# Patient Record
Sex: MALE | Race: OTHER | NOT HISPANIC OR LATINO
[De-identification: names, ages, dates, MRNs, and addresses within clinical notes are randomized per-mention and may not be internally consistent; named-entity substitution may affect disease eponyms.]

---

## 2020-07-01 ENCOUNTER — APPOINTMENT (OUTPATIENT)
Dept: INTERNAL MEDICINE | Facility: CLINIC | Age: 25
End: 2020-07-01
Payer: COMMERCIAL

## 2020-07-01 ENCOUNTER — LABORATORY RESULT (OUTPATIENT)
Age: 25
End: 2020-07-01

## 2020-07-01 VITALS
DIASTOLIC BLOOD PRESSURE: 86 MMHG | HEIGHT: 66 IN | TEMPERATURE: 99.3 F | HEART RATE: 118 BPM | OXYGEN SATURATION: 98 % | WEIGHT: 110 LBS | BODY MASS INDEX: 17.68 KG/M2 | SYSTOLIC BLOOD PRESSURE: 124 MMHG

## 2020-07-01 DIAGNOSIS — Z78.9 OTHER SPECIFIED HEALTH STATUS: ICD-10-CM

## 2020-07-01 DIAGNOSIS — Z00.00 ENCOUNTER FOR GENERAL ADULT MEDICAL EXAMINATION W/OUT ABNORMAL FINDINGS: ICD-10-CM

## 2020-07-01 DIAGNOSIS — Z13.31 ENCOUNTER FOR SCREENING FOR DEPRESSION: ICD-10-CM
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
## 2020-07-01 PROCEDURE — G0444 DEPRESSION SCREEN ANNUAL: CPT

## 2020-07-01 PROCEDURE — 99213 OFFICE O/P EST LOW 20 MIN: CPT | Mod: 25

## 2020-07-01 PROCEDURE — 99385 PREV VISIT NEW AGE 18-39: CPT

## 2020-07-01 NOTE — HEALTH RISK ASSESSMENT
[Good] : ~his/her~  mood as  good [Yes] : Yes [] : No [Monthly or less (1 pt)] : Monthly or less (1 point) [1 or 2 (0 pts)] : 1 or 2 (0 points) [No falls in past year] : Patient reported no falls in the past year [Never (0 pts)] : Never (0 points) [Audit-CScore] : 1 [Change in mental status noted] : No change in mental status noted [Language] : denies difficulty with language [Behavior] : denies difficulty with behavior [Learning/Retaining New Information] : denies difficulty learning/retaining new information [Reasoning] : denies difficulty with reasoning [Handling Complex Tasks] : denies difficulty handling complex tasks [Spatial Ability and Orientation] : denies difficulty with spatial ability and orientation [None] : None [With Family] : lives with family [Student] : student [Sexually Active] : sexually active [Single] : single [High Risk Behavior] : no high risk behavior [Feels Safe at Home] : Feels safe at home [Fully functional (bathing, dressing, toileting, transferring, walking, feeding)] : Fully functional (bathing, dressing, toileting, transferring, walking, feeding) [Fully functional (using the telephone, shopping, preparing meals, housekeeping, doing laundry, using] : Fully functional and needs no help or supervision to perform IADLs (using the telephone, shopping, preparing meals, housekeeping, doing laundry, using transportation, managing medications and managing finances) [Reports changes in hearing] : Reports no changes in hearing [Reports changes in vision] : Reports no changes in vision [Reports normal functional visual acuity (ie: able to read med bottle)] : Reports normal functional visual acuity [Reports changes in dental health] : Reports no changes in dental health [Smoke Detector] : smoke detector [Carbon Monoxide Detector] : carbon monoxide detector [Guns at Home] : no guns at home [Safety elements used in home] : safety elements used in home [Seat Belt] :  uses seat belt [Sunscreen] : uses sunscreen [Travel to Developing Areas] : does not  travel to developing areas [TB Exposure] : is not being exposed to tuberculosis [Caregiver Concerns] : does not have caregiver concerns [AdvancecareDate] : 07/20 [Reviewed no changes] : Reviewed no changes

## 2020-07-01 NOTE — ASSESSMENT
[FreeTextEntry1] : \par Preventive visit: pt is up to date with vaccines; he does not smoke cigarettes and does not misuse alcohol; he feels safe at home and has smoke/CO detectors in the house; he wears seatbelts when in vehicles\par \par Medical issue 1: Tick bite: new symptom of rash on L upper chest; believes he was bitten by tick from his dog; he is taking his dog to the vet today; bite occurred about 1 week ago; he denies fevers, chills, joint pains and myalgias; no rash visible on exam today, no Target lesion; doubt Lyme because not from deer tick rather dog tick but will check labs and encouraged follow-up from vet regarding any disease dog may have picked up\par \par Depression screen performed today, PHQ2=0

## 2020-07-01 NOTE — HISTORY OF PRESENT ILLNESS
[de-identified] : \par Preventive visit: pt is up to date with vaccines; he does not smoke cigarettes and does not misuse alcohol; he feels safe at home and has smoke/CO detectors in the house; he wears seatbelts when in vehicles\par \par Medical issue 1: Tick bite: new symptom of rash on L upper chest; believes he was bitten by tick from his dog; he is taking his dog to the vet today; bite occurred about 1 week ago; he denies fevers, chills, joint pains and myalgias [FreeTextEntry1] : Presents to establish care for preventive visit as well as concerns regarding tick bite.

## 2020-07-06 LAB
ALBUMIN SERPL ELPH-MCNC: 5.4 G/DL
ALP BLD-CCNC: 56 U/L
ALT SERPL-CCNC: 15 U/L
ANION GAP SERPL CALC-SCNC: 13 MMOL/L
AST SERPL-CCNC: 20 U/L
B BURGDOR IGG+IGM SER QL IB: NORMAL
BASOPHILS # BLD AUTO: 0.03 K/UL
BASOPHILS NFR BLD AUTO: 0.5 %
BILIRUB SERPL-MCNC: 0.4 MG/DL
BUN SERPL-MCNC: 14 MG/DL
CALCIUM SERPL-MCNC: 10.1 MG/DL
CHLORIDE SERPL-SCNC: 101 MMOL/L
CHOLEST SERPL-MCNC: 199 MG/DL
CHOLEST/HDLC SERPL: 2.2 RATIO
CO2 SERPL-SCNC: 26 MMOL/L
CREAT SERPL-MCNC: 1.2 MG/DL
EOSINOPHIL # BLD AUTO: 0.07 K/UL
EOSINOPHIL NFR BLD AUTO: 1.2 %
ESTIMATED AVERAGE GLUCOSE: 105 MG/DL
GLUCOSE SERPL-MCNC: 110 MG/DL
HBA1C MFR BLD HPLC: 5.3 %
HCT VFR BLD CALC: 46.1 %
HDLC SERPL-MCNC: 92 MG/DL
HGB BLD-MCNC: 15.1 G/DL
HIV1+2 AB SPEC QL IA.RAPID: NONREACTIVE
IMM GRANULOCYTES NFR BLD AUTO: 0.2 %
LDLC SERPL CALC-MCNC: 95 MG/DL
LYMPHOCYTES # BLD AUTO: 2.05 K/UL
LYMPHOCYTES NFR BLD AUTO: 35.7 %
MAN DIFF?: NORMAL
MCHC RBC-ENTMCNC: 29.4 PG
MCHC RBC-ENTMCNC: 32.8 GM/DL
MCV RBC AUTO: 89.7 FL
MONOCYTES # BLD AUTO: 0.38 K/UL
MONOCYTES NFR BLD AUTO: 6.6 %
NEUTROPHILS # BLD AUTO: 3.21 K/UL
NEUTROPHILS NFR BLD AUTO: 55.8 %
PLATELET # BLD AUTO: 292 K/UL
POTASSIUM SERPL-SCNC: 4 MMOL/L
PROT SERPL-MCNC: 7.7 G/DL
RBC # BLD: 5.14 M/UL
RBC # FLD: 13 %
SODIUM SERPL-SCNC: 140 MMOL/L
TRIGL SERPL-MCNC: 59 MG/DL
TSH SERPL-ACNC: 1.73 UIU/ML
WBC # FLD AUTO: 5.75 K/UL

## 2020-07-07 DIAGNOSIS — A77.0 SPOTTED FEVER DUE TO RICKETTSIA RICKETTSII: ICD-10-CM

## 2020-07-07 LAB
A PHAGO GROEL BLD QL NAA+NON-PROBE: NEGATIVE
B DIV+MO-1 18S RRNA BLD QL NAA+NON-PROBE: NEGATIVE
B DUNCANI 18S RRNA BLD QL NAA+NON-PROBE: NEGATIVE
B MICROTI 18S RRNA BLD QL NAA+NON-PROBE: NEGATIVE
E CANIS+EWIN GROEL BLD QL NAA+NON-PROBE: NEGATIVE
E CHAFF GROEL BLD QL NAA+NON-PROBE: NEGATIVE
E MURIS EAUCL GROEL BLD QL NAA+NON-PRB: NEGATIVE
R RICKETTSI IGG CSF-ACNC: NEGATIVE
R RICKETTSI IGM CSF-ACNC: 1.2 INDEX

## 2020-07-07 RX ORDER — DOXYCYCLINE HYCLATE 100 MG/1
100 TABLET ORAL
Qty: 14 | Refills: 0 | Status: COMPLETED | COMMUNITY
Start: 2020-07-07 | End: 2020-09-20

## 2023-05-29 ENCOUNTER — EMERGENCY (EMERGENCY)
Facility: HOSPITAL | Age: 28
LOS: 1 days | Discharge: HOME | End: 2023-05-29
Admitting: STUDENT IN AN ORGANIZED HEALTH CARE EDUCATION/TRAINING PROGRAM
Payer: MEDICAID

## 2023-05-29 VITALS
TEMPERATURE: 98 F | RESPIRATION RATE: 14 BRPM | SYSTOLIC BLOOD PRESSURE: 120 MMHG | DIASTOLIC BLOOD PRESSURE: 80 MMHG | OXYGEN SATURATION: 100 % | HEART RATE: 98 BPM

## 2023-05-29 VITALS
DIASTOLIC BLOOD PRESSURE: 70 MMHG | HEART RATE: 84 BPM | SYSTOLIC BLOOD PRESSURE: 114 MMHG | RESPIRATION RATE: 14 BRPM | OXYGEN SATURATION: 100 %

## 2023-05-29 PROCEDURE — 73630 X-RAY EXAM OF FOOT: CPT | Mod: 26,LT

## 2023-05-29 PROCEDURE — 73590 X-RAY EXAM OF LOWER LEG: CPT | Mod: 26,LT

## 2023-05-29 PROCEDURE — 73610 X-RAY EXAM OF ANKLE: CPT | Mod: 26,LT

## 2023-05-29 PROCEDURE — 99285 EMERGENCY DEPT VISIT HI MDM: CPT

## 2023-05-29 RX ORDER — ACETAMINOPHEN 500 MG
650 TABLET ORAL ONCE
Refills: 0 | Status: COMPLETED | OUTPATIENT
Start: 2023-05-29 | End: 2023-05-29

## 2023-05-29 RX ADMIN — Medication 650 MILLIGRAM(S): at 15:40

## 2023-05-29 NOTE — ED PROVIDER NOTE - OBJECTIVE STATEMENT
Patient is a 27-year-old male with no pertinent past medical history presents with left foot pain since yesterday.  Patient reports while playing basketball, he jumped and landed onto left foot causing his foot to twist.  Patient reports he finished playing basketball.  Upon arriving home yesterday evening he noticed gradual onset pain and swelling at the dorsal lateral aspect of his left foot.  Patient reports pain worsens upon standing and walking.  Patient denies any fevers, chills, numbness, weakness, head trauma, neck pain, back pain, pain/injuries elsewhere.

## 2023-05-29 NOTE — ED PROVIDER NOTE - LOWER EXTREMITY EXAM, LEFT
+bruising and tenderness at dorsolateral aspect of left foot; no point tenderness; 2+ pulses; < 2 sec cap refill; 5/5 strength; sensation intact; no calf tenderness; no palpable cords; soft compartments

## 2023-05-29 NOTE — ED PROVIDER NOTE - BIRTH SEX
VN reviewed discharge instructions with pt.  AVS printed and handed to pt by bedside nurse.  Reviewed follow-up appointments, medications, diet, and importance of medication compliance.  Reviewed home care instructions, treatment plan, self-management, and when to seek medical attention.  Allowed time for questions.  All questions answered.  Patient verbalized complete understanding of discharge instructions and voices no concerns.     Discharge instructions complete. Waiting on  Bedside delivery .   Bedside nurse notified.      Male

## 2023-05-29 NOTE — CONSULT NOTE ADULT - ASSESSMENT
27 M with Left foot injury  - Patient seen and evaluated  - Patient is afebrile, VSS, neurovascular status  - Left foot global foot non pitting edema, with no ecchymoses no open fractures, normal skin tension lines, no skin tenting, no drainage. right foot with no acute signs of injury.  - Applied sheffield compression with posterior splint  - Patient instructed to keep left lower extremity non weight bearing using crutches  - Patient to keep LLE elevated under 2 pillows while in bed  - Patient instructed to keep posterior splint clean, cry and intact  - Patient to follow up outpatient within one week of discharge with Dr Castellon  - Patient to call [phone] for follow up appointment   -Discussed with attending

## 2023-05-29 NOTE — ED PROVIDER NOTE - CLINICAL SUMMARY MEDICAL DECISION MAKING FREE TEXT BOX
Patient is a 27-year-old male with no pertinent past medical history presents with left foot pain since yesterday.  Patient reports while playing basketball, he jumped and landed onto left foot causing his foot to twist.  Patient reports he finished playing basketball.  Upon arriving home yesterday evening he noticed gradual onset pain and swelling at the dorsal lateral aspect of his left foot.  Patient reports pain worsens upon standing and walking.  Patient denies any fevers, chills, numbness, weakness, head trauma, neck pain, back pain, pain/injuries elsewhere.  This is a patient with likely musculoskeletal left foot pain, possible fracture; very low clinical suspicion for disease processes including but not limited to septic joint, compartment syndrome.  Plan to order x-ray and pain medication.  Disposition pending work-up.

## 2023-05-29 NOTE — ED ADULT TRIAGE NOTE - CHIEF COMPLAINT QUOTE
pt c/o left foot pain after playing basketball yesterday. noted swelling and increased pain this am. ice pack applied

## 2023-05-29 NOTE — CONSULT NOTE ADULT - SUBJECTIVE AND OBJECTIVE BOX
Patient is a 27y old  Male who presents with a chief complaint of left foot injury    HPI: Patient is a 27-year-old male with no pertinent past medical history presents with left foot pain since yesterday.  Patient reports while playing basketball, he jumped and landed onto left foot causing his foot to twist.  Patient reports he finished playing basketball.  Upon arriving home yesterday evening he noticed gradual onset pain and swelling at the dorsal lateral aspect of his left foot.  Patient reports pain worsens upon standing and walking.  Patient denies any fevers, chills, numbness, weakness, head trauma, neck pain, back pain, pain/injuries elsewhere      PAST MEDICAL & SURGICAL HISTORY:      MEDICATIONS  (STANDING):    MEDICATIONS  (PRN):      Allergies    No Known Allergies    Intolerances        VITALS:    Vital Signs Last 24 Hrs  T(C): 36.8 (29 May 2023 13:55), Max: 36.8 (29 May 2023 13:55)  T(F): 98.3 (29 May 2023 13:55), Max: 98.3 (29 May 2023 13:55)  HR: 84 (29 May 2023 18:42) (84 - 98)  BP: 114/70 (29 May 2023 18:42) (114/70 - 120/80)  BP(mean): --  RR: 14 (29 May 2023 18:42) (14 - 14)  SpO2: 100% (29 May 2023 18:42) (100% - 100%)    Parameters below as of 29 May 2023 18:42  Patient On (Oxygen Delivery Method): room air        LABS:                CAPILLARY BLOOD GLUCOSE              LOWER EXTREMITY PHYSICAL EXAM:    Vascular: DP/PT 1/4, B/L, CFT <3 seconds B/L, Temperature gradient warm to cool, B/L.   Neuro: Epicritic sensation intact to the level of toes, B/L.  Musculoskeletal/Ortho: pain on palpation to dorsal aspect of 5th metatarsal. able to dorsiflex invert, herman and plantarflexion with slight pain  Skin: Left foot global foot non pitting edema, with no ecchymoses no open fractures, normal skin tension lines, no skin tenting, no drainage. right foot with no acute signs of injury.  - Applied sheffield compression with posterior splint    RADIOLOGY & ADDITIONAL STUDIES:    < from: Xray Foot AP + Lateral + Oblique, Left (05.29.23 @ 15:34) >    ******PRELIMINARY REPORT******      ******PRELIMINARY REPORT******         ACC: [id number] EXAM:  XR ANKLE COMP MIN 3 VIEWS LT   ORDERED BY: HENRIQUE MYERS     ACC: [id number] EXAM:  XR TIB FIB AP LAT 2 VIEWS LT   ORDERED BY: HENRIQUE MYERS     ACC: [id number] EXAM:  XR FOOT COMP MIN 3 VIEWS LT   ORDERED BY: HENRIQUE MYERS     PROCEDURE DATE:  05/29/2023    ******PRELIMINARY REPORT******      ******PRELIMINARY REPORT******           INTERPRETATION:  XR FOOT 3 VIEWS LEFT, XR TIBIA FIBULA LEFT, XR ANKLE   COMPLETE 3 VIEWS LEFT    CLINICAL INDICATION: left foot pain after playing basketball yesterday.    TECHNIQUE: 3 views of left foot and ankle.    COMPARISON: No similar prior comparisons available.    FINDINGS:  Acute fracture of the base of the left fifth metatarsal. No dislocation.   Joint spaces are maintained.    IMPRESSION:  Acute fracture of the base of the left fifth metatarsal.        ******PRELIMINARY REPORT******      ******PRELIMINARY REPORT******       MICHELE VELIZ MD; Resident Radiologist  This document is a PRELIMINARY interpretation and is pending final   attending approval. May 29 2023  4:36PM    < end of copied text >

## 2023-05-29 NOTE — ED PROVIDER NOTE - CARE PROVIDER_API CALL
Kait Castellon  Podiatric Medicine  [address], [address]  [place] [address]  Phone: [phone]  Fax: [phone]  Follow Up Time:

## 2023-05-29 NOTE — ED ADULT NURSE NOTE - OBJECTIVE STATEMENT
Pt received in results waiting. Pt is a&ox3, resp even and unlabored, no acute distress in appearance. Pt reports a traumatic injury to left ankle during basketball game yesterday, increasing pain since event and having difficulty weight bearing/walking. Pt medicated per orders.

## 2023-05-29 NOTE — ED ADULT NURSE NOTE - NSFALLUNIVINTERV_ED_ALL_ED
Bed/Stretcher in lowest position, wheels locked, appropriate side rails in place/Call bell, personal items and telephone in reach/Instruct patient to call for assistance before getting out of bed/chair/stretcher/Non-slip footwear applied when patient is off stretcher/[place] to call system/Physically safe environment - no spills, clutter or unnecessary equipment/Purposeful proactive rounding/Room/bathroom lighting operational, light cord in reach

## 2023-05-29 NOTE — ED PROVIDER NOTE - PATIENT PORTAL LINK FT
You can access the FollowMyHealth Patient Portal offered by [facility] by registering at the following website: [email or web address]. By joining [employer]’s FollowMyHealth portal, you will also be able to view your health information using other applications (apps) compatible with our system.

## 2023-05-29 NOTE — ED PROVIDER NOTE - NSFOLLOWUPINSTRUCTIONS_ED_ALL_ED_FT
Advance activity as tolerated.  Continue all previously prescribed medications as directed unless otherwise instructed.  Take Tylenol 650mg (Two 325 mg pills) every 4-6 hours as needed for pain or fevers.  Take Motrin (also sold as Advil or Ibuprofen) 400-600 mg (two or three 200 mg over the counter pills) every 8 hours as needed for moderate pain or fevers-- take with food; do not take for more than 5 consecutive days.  Keep extremity elevated and wrapped.  Apply cool compresses to affected area for 15 minutes, 3-4 times per day.  Ambulate with crutches as shown to you in the emergency department.  Follow up with your primary care physician and podiatry in 48-72 hours- bring copies of your results.  Return to the ER for worsening or persistent symptoms, including but not limited to worsening/persistent pain, fever, redness, swelling, numbness, weakness, difficulty standing/walking, falls, and/or ANY NEW OR CONCERNING SYMPTOMS. If you have issues obtaining follow up, please call: [phone] to obtain a doctor or specialist who takes your insurance in your area.  You may call [phone] to make an appointment with the internal medicine clinic.     MUSCULOSKELETAL PAIN - General Information    Musculoskeletal Pain    WHAT YOU NEED TO KNOW:    What do I need to know about musculoskeletal pain? Musculoskeletal pain can occur in muscles, bones, ligaments, tendons, or nerves. The pain can be dull, achy, or sharp. You may have pain and tenderness to the touch as well. The pain can occur anywhere in your body. Musculoskeletal pain can be from an injury, or a medical condition such as polymyositis.    How is the cause of musculoskeletal pain diagnosed? Your healthcare provider will ask about past medical conditions or injuries. He or she may touch, press, bend, stretch, or move the painful area. Tell your provider if the pain is constant or goes away and comes back. Tell him or her if the pain is dull, sharp, or achy. Also tell him or her if the pain wakes you from sleep. You may also need any of the following tests:    X-ray, MRI, or CT scan pictures may show an injury or health condition that is causing your pain. Contrast liquid may be given to help the area show up better in the pictures. Tell the healthcare provider if you have ever had an allergic reaction to contrast liquid. Do not enter the MRI room with anything metal. Metal can cause serious injury. Tell the provider if you have any metal in or on your body.    Ultrasound pictures may show problems in your muscles or tissues that are causing your pain.  How is musculoskeletal pain treated?    NSAIDs help decrease swelling and pain or fever. This medicine is available with or without a doctor's order. NSAIDs can cause stomach bleeding or kidney problems in certain people. If you take blood thinner medicine, always ask your healthcare provider if NSAIDs are safe for you. Always read the medicine label and follow directions.    Acetaminophen decreases pain and fever. It is available without a doctor's order. Ask how much to take and how often to take it. Follow directions. Read the labels of all other medicines you are using to see if they also contain acetaminophen, or ask your doctor or pharmacist. Acetaminophen can cause liver damage if not taken correctly.    Muscle relaxers help relax your muscles to decrease pain and muscle spasms.    Steroids may be given to decrease redness, pain, and swelling.  What can I do to manage my symptoms?    Rest as directed. Avoid activity that causes pain. You may be able to return to normal activity when you can move without pain. Follow directions for rest and activity. You are at risk for injury for 3 weeks after your symptoms go away.    Ice the painful area to decrease pain and swelling. Use an ice pack, or put ice in a plastic bag and cover it with a towel. Always put a cloth between the ice and your skin. Apply the ice as often as directed for the first 24 to 48 hours.    Apply compression to the area, if directed. Your healthcare provider may want you to use a splint, brace, or elastic bandage. Compression helps decrease pain and swelling in an arm or leg. A splint, brace, or bandage will also help protect the painful area when you move around.  How to Wrap an Elastic Bandage      Elevate a painful arm or leg to reduce swelling and pain. Elevate your limb while you are sitting or lying. Prop a painful leg on pillows to keep it above the level of your heart.    Elevate Leg  When should I seek immediate care?    You have severe pain when you move the area.    You lose feeling in the area.    You have new or worse pain or swelling in the area. Your skin may feel tight.  When should I call my doctor?    You have a fever.    You have pain that does not get better with treatment.    You have trouble sleeping because of your pain.    Your painful area becomes more tender, red, and warm to the touch.    You have less movement of the painful area.    You have questions or concerns about your condition or care.  CARE AGREEMENT:    You have the right to help plan your care. Learn about your health condition and how it may be treated. Discuss treatment options with your healthcare providers to decide what care you want to receive. You always have the right to refuse treatment.    © [employer] 1973, 2023    P.R.I.C.E. TREATMENT - General Information    P.R.I.C.E. Treatment    WHAT YOU NEED TO KNOW:    What is P.R.I.C.E. treatment? P.R.I.C.E. treatment is a 5-step process used to decrease swelling and pain caused by an injury. P.R.I.C.E. stands for protect, rest, ice, compress, and elevate. Start P.R.I.C.E. within 24 to 48 hours of an injury.    How do I use P.R.I.C.E. treatment?  R.I.C.E.    Protect your injury from more damage. Support the injured area with a brace or splint. Your healthcare provider will tell you how long to use the brace or splint.    Rest your injured area as directed. You may need to stop using, or keep weight off, the injury for 48 hours or longer. Your healthcare provider may recommend crutches or another device. Return to your usual activities as directed.    Apply ice on your injured area for 15 to 20 minutes every 4 hours or as directed. Use an ice pack, or put crushed ice in a plastic bag. Cover the bag with a towel before you apply it to your skin. Ice helps prevent tissue damage and decreases swelling and pain.    Compress (keep pressure on) the injured area. Compression will help decrease swelling and support the injured area. Use an elastic bandage, air stirrup, splint, or sling as directed. If you use an elastic bandage, make sure the bandage is not too tight. You should be able to slip 2 fingers between the bandage and your skin.    Elevate the injured area above the level of your heart as often as you can. This will help decrease swelling and pain. Prop the injured area on pillows or blankets to keep it elevated comfortably.  When should I seek immediate care?    Your pain is severe.    You have severe swelling or deformity.    You have numbness in the injured area.  When should I call my doctor?    Your pain and swelling do not go away after a few days.    You have questions or concerns about your condition or care.  CARE AGREEMENT:    You have the right to help plan your care. Learn about your health condition and how it may be treated. Discuss treatment options with your healthcare providers to decide what care you want to receive. You always have the right to refuse treatment.    © [employer] 1973, 2023 Do not bear weight on the injured leg.  keep the splint clean and dry.   Take Tylenol 650mg (Two 325 mg pills) every 4-6 hours as needed for pain or fevers.  Take Motrin 600 mg every 8 hours as needed for moderate pain or fevers -- take with food.   Follow up with podiatry Dr. Castellon in 1 week - please call to make an appointment.   Advance activity as tolerated.  Continue all previously prescribed medications as directed unless otherwise instructed.   Keep extremity elevated and wrapped.  Apply cool compresses to affected area for 15 minutes, 3-4 times per day.    Ambulate with crutches as shown to you in the emergency department.    Follow up with your primary care physician and podiatry in 48-72 hours- bring copies of your results.  Return to the ER for worsening or persistent symptoms, including but not limited to worsening/persistent pain, fever, redness, swelling, numbness, weakness, difficulty standing/walking, falls, and/or ANY NEW OR CONCERNING SYMPTOMS. If you have issues obtaining follow up, please call: [phone] to obtain a doctor or specialist who takes your insurance in your area.  You may call [phone] to make an appointment with the internal medicine clinic.         MUSCULOSKELETAL PAIN - General Information    Musculoskeletal Pain    WHAT YOU NEED TO KNOW:    What do I need to know about musculoskeletal pain? Musculoskeletal pain can occur in muscles, bones, ligaments, tendons, or nerves. The pain can be dull, achy, or sharp. You may have pain and tenderness to the touch as well. The pain can occur anywhere in your body. Musculoskeletal pain can be from an injury, or a medical condition such as polymyositis.    How is the cause of musculoskeletal pain diagnosed? Your healthcare provider will ask about past medical conditions or injuries. He or she may touch, press, bend, stretch, or move the painful area. Tell your provider if the pain is constant or goes away and comes back. Tell him or her if the pain is dull, sharp, or achy. Also tell him or her if the pain wakes you from sleep. You may also need any of the following tests:    X-ray, MRI, or CT scan pictures may show an injury or health condition that is causing your pain. Contrast liquid may be given to help the area show up better in the pictures. Tell the healthcare provider if you have ever had an allergic reaction to contrast liquid. Do not enter the MRI room with anything metal. Metal can cause serious injury. Tell the provider if you have any metal in or on your body.    Ultrasound pictures may show problems in your muscles or tissues that are causing your pain.  How is musculoskeletal pain treated?    NSAIDs help decrease swelling and pain or fever. This medicine is available with or without a doctor's order. NSAIDs can cause stomach bleeding or kidney problems in certain people. If you take blood thinner medicine, always ask your healthcare provider if NSAIDs are safe for you. Always read the medicine label and follow directions.    Acetaminophen decreases pain and fever. It is available without a doctor's order. Ask how much to take and how often to take it. Follow directions. Read the labels of all other medicines you are using to see if they also contain acetaminophen, or ask your doctor or pharmacist. Acetaminophen can cause liver damage if not taken correctly.    Muscle relaxers help relax your muscles to decrease pain and muscle spasms.    Steroids may be given to decrease redness, pain, and swelling.  What can I do to manage my symptoms?    Rest as directed. Avoid activity that causes pain. You may be able to return to normal activity when you can move without pain. Follow directions for rest and activity. You are at risk for injury for 3 weeks after your symptoms go away.    Ice the painful area to decrease pain and swelling. Use an ice pack, or put ice in a plastic bag and cover it with a towel. Always put a cloth between the ice and your skin. Apply the ice as often as directed for the first 24 to 48 hours.    Apply compression to the area, if directed. Your healthcare provider may want you to use a splint, brace, or elastic bandage. Compression helps decrease pain and swelling in an arm or leg. A splint, brace, or bandage will also help protect the painful area when you move around.  How to Wrap an Elastic Bandage      Elevate a painful arm or leg to reduce swelling and pain. Elevate your limb while you are sitting or lying. Prop a painful leg on pillows to keep it above the level of your heart.    Elevate Leg  When should I seek immediate care?    You have severe pain when you move the area.    You lose feeling in the area.    You have new or worse pain or swelling in the area. Your skin may feel tight.  When should I call my doctor?    You have a fever.    You have pain that does not get better with treatment.    You have trouble sleeping because of your pain.    Your painful area becomes more tender, red, and warm to the touch.    You have less movement of the painful area.    You have questions or concerns about your condition or care.  CARE AGREEMENT:    You have the right to help plan your care. Learn about your health condition and how it may be treated. Discuss treatment options with your healthcare providers to decide what care you want to receive. You always have the right to refuse treatment.    © [facility] [employer] 1973, 2023    P.R.I.C.E. TREATMENT - General Information    P.R.I.C.E. Treatment    WHAT YOU NEED TO KNOW:    What is P.R.I.C.E. treatment? P.R.I.C.E. treatment is a 5-step process used to decrease swelling and pain caused by an injury. P.R.I.C.E. stands for protect, rest, ice, compress, and elevate. Start P.R.I.C.E. within 24 to 48 hours of an injury.    How do I use P.R.I.C.E. treatment?  R.I.C.E.    Protect your injury from more damage. Support the injured area with a brace or splint. Your healthcare provider will tell you how long to use the brace or splint.    Rest your injured area as directed. You may need to stop using, or keep weight off, the injury for 48 hours or longer. Your healthcare provider may recommend crutches or another device. Return to your usual activities as directed.    Apply ice on your injured area for 15 to 20 minutes every 4 hours or as directed. Use an ice pack, or put crushed ice in a plastic bag. Cover the bag with a towel before you apply it to your skin. Ice helps prevent tissue damage and decreases swelling and pain.    Compress (keep pressure on) the injured area. Compression will help decrease swelling and support the injured area. Use an elastic bandage, air stirrup, splint, or sling as directed. If you use an elastic bandage, make sure the bandage is not too tight. You should be able to slip 2 fingers between the bandage and your skin.    Elevate the injured area above the level of your heart as often as you can. This will help decrease swelling and pain. Prop the injured area on pillows or blankets to keep it elevated comfortably.  When should I seek immediate care?    Your pain is severe.    You have severe swelling or deformity.    You have numbness in the injured area.  When should I call my doctor?    Your pain and swelling do not go away after a few days.    You have questions or concerns about your condition or care.  CARE AGREEMENT:    You have the right to help plan your care. Learn about your health condition and how it may be treated. Discuss treatment options with your healthcare providers to decide what care you want to receive. You always have the right to refuse treatment.    © [employer] 1973, 2023

## 2023-10-29 ENCOUNTER — EMERGENCY (EMERGENCY)
Facility: HOSPITAL | Age: 28
LOS: 1 days | Discharge: LEFT BEFORE DISCHARGE | End: 2023-10-29
Attending: STUDENT IN AN ORGANIZED HEALTH CARE EDUCATION/TRAINING PROGRAM | Admitting: STUDENT IN AN ORGANIZED HEALTH CARE EDUCATION/TRAINING PROGRAM
Payer: MEDICAID

## 2023-10-29 VITALS
SYSTOLIC BLOOD PRESSURE: 126 MMHG | HEART RATE: 108 BPM | DIASTOLIC BLOOD PRESSURE: 104 MMHG | RESPIRATION RATE: 16 BRPM | OXYGEN SATURATION: 100 % | TEMPERATURE: 98 F

## 2023-10-29 PROCEDURE — 99283 EMERGENCY DEPT VISIT LOW MDM: CPT

## 2023-10-29 RX ORDER — IBUPROFEN 200 MG
600 TABLET ORAL ONCE
Refills: 0 | Status: COMPLETED | OUTPATIENT
Start: 2023-10-29 | End: 2023-10-29

## 2023-10-29 RX ORDER — ACETAMINOPHEN 500 MG
975 TABLET ORAL ONCE
Refills: 0 | Status: COMPLETED | OUTPATIENT
Start: 2023-10-29 | End: 2023-10-29

## 2023-10-29 NOTE — ED PROVIDER NOTE - PHYSICAL EXAMINATION
Gen:  Well appearning in NAD  Head:  NC/AT  Resp: No distress   Ext: no deformities  Skin: warm and dry as visualized  dental exam: no palpable mass or abscess. no obv dental carries

## 2023-10-29 NOTE — ED PROVIDER NOTE - OBJECTIVE STATEMENT
27yo M otherwise healthy pw 2 weeks of pain is his mouth. pt states he has pain in his upper gums and feels like his teeth are shifting. pt states he was seen by 2 dentists and was given motrin and mouth wash,. has been taking motrin but not using the mouthwash. no fevers or chills. sometimes pain is on right and sometimes on left.

## 2023-10-29 NOTE — ED ADULT TRIAGE NOTE - CHIEF COMPLAINT QUOTE
pt mouth pain x1 week. saw a dentist and was given mouth rinse and NSAIDS, which haven't helped. feels that teeth are shifting. denies bleeding or injury. Phx.

## 2023-10-29 NOTE — ED PROVIDER NOTE - CLINICAL SUMMARY MEDICAL DECISION MAKING FREE TEXT BOX
29yo M with 2 weeks of gum pain and feeling of teeth shifting, so systemic signs of infection, no obv abscess on exam. will recommend multimodal pain control and dental follow up

## 2023-10-29 NOTE — ED ADULT NURSE REASSESSMENT NOTE - NS ED NURSE REASSESS COMMENT FT1
Pt left ED before speaking to this RN. Spoke to MD and left before treatment was completed. Pt is stable and breathing normally on room air.

## 2023-11-01 ENCOUNTER — EMERGENCY (EMERGENCY)
Facility: HOSPITAL | Age: 28
LOS: 1 days | Discharge: HOME | End: 2023-11-01
Attending: EMERGENCY MEDICINE | Admitting: EMERGENCY MEDICINE
Payer: MEDICAID

## 2023-11-01 VITALS — HEART RATE: 95 BPM | OXYGEN SATURATION: 100 % | RESPIRATION RATE: 16 BRPM

## 2023-11-01 VITALS
TEMPERATURE: 98 F | OXYGEN SATURATION: 98 % | SYSTOLIC BLOOD PRESSURE: 158 MMHG | RESPIRATION RATE: 18 BRPM | HEART RATE: 119 BPM | DIASTOLIC BLOOD PRESSURE: 90 MMHG

## 2023-11-01 PROBLEM — Z78.9 OTHER SPECIFIED HEALTH STATUS: Chronic | Status: ACTIVE | Noted: 2023-10-29

## 2023-11-01 PROCEDURE — 99283 EMERGENCY DEPT VISIT LOW MDM: CPT

## 2023-11-01 NOTE — ED PROVIDER NOTE - PATIENT PORTAL LINK FT
You can access the FollowMyHealth Patient Portal offered by [facility] by registering at the following website: [email or web address]. By joining [facility] FollowMyHealth portal, you will also be able to view your health information using other applications (apps) compatible with our system.

## 2023-11-01 NOTE — ED PROVIDER NOTE - NSFOLLOWUPINSTRUCTIONS_ED_ALL_ED_FT
- Please follow up with your Primary Care Doctor within 2 weeks.     - Be sure to return to the ED if you develop new, worsening, or any distressing symptoms.

## 2023-11-01 NOTE — ED ADULT TRIAGE NOTE - CHIEF COMPLAINT QUOTE
c/o of a growth and pain under fingernails in hands B/l, no broth noted on assessment, pt denies any medical or psychiatric phx.

## 2023-11-01 NOTE — ED PROVIDER NOTE - CLINICAL SUMMARY MEDICAL DECISION MAKING FREE TEXT BOX
[facility] [facility]: pt with sensation of something growing under fingers, exam shows skin under fingers, no emergent process, neurovasc intact. Plan to dc with pcp f/u

## 2023-11-01 NOTE — ED PROVIDER NOTE - OBJECTIVE STATEMENT
28M no PMH presents with the sensation that skin is growing under his fingernails mostly under the second finger of R hand, states that he noticed it this morning. No trauma to the area. No fevers/chills, uri sxs, cough, chest pain/sob, palpitations, abd pain, n/v/d, dark/bloody stools, urinary sxs.

## 2023-11-01 NOTE — ED PROVIDER NOTE - PHYSICAL EXAMINATION
GENERAL: no acute distress, non-toxic appearing  HEENT: normal conjunctiva  CARDIAC: regular rate and regular rhythm, 2+ radial pulses bilaterally, good cap refill all fingers  PULM: clear to ascultation bilaterally, sats 100% on RA, no increased work of breathing  GI: abdomen nondistended, soft, nontender  : no suprapubic tenderness  NEURO: alert and oriented x 3, normal speech, moving all extremities without lateralization  MSK: no visible deformities, no peripheral edema, pt with skin under fingernails, no swelling/erythema/signs of paronychia  SKIN: no visible rashes  PSYCH: anxious affect